# Patient Record
Sex: FEMALE | Race: WHITE
[De-identification: names, ages, dates, MRNs, and addresses within clinical notes are randomized per-mention and may not be internally consistent; named-entity substitution may affect disease eponyms.]

---

## 2019-09-03 ENCOUNTER — HOSPITAL ENCOUNTER (OUTPATIENT)
Dept: HOSPITAL 61 - RAD | Age: 59
Discharge: HOME | End: 2019-09-03
Attending: SURGERY
Payer: COMMERCIAL

## 2019-09-03 DIAGNOSIS — M79.89: ICD-10-CM

## 2019-09-03 DIAGNOSIS — M17.12: Primary | ICD-10-CM

## 2019-09-03 PROCEDURE — 73560 X-RAY EXAM OF KNEE 1 OR 2: CPT

## 2019-09-03 NOTE — RAD
3 view study of the left knee

 

Clinical indications: Left knee pain.

 

FINDINGS: No acute fracture or dislocation or lytic process is evident. No

significant left knee joint effusion is evident. There is mild 

degenerative spurring with mild joint space narrowing involving the medial

tibiofemoral joint compartment. Soft tissue swelling is seen medially. 

There is mild degenerative spurring of the patellofemoral joint 

compartment.

 

IMPRESSION: Mild primary degenerative osteoarthritis. Mild soft tissue 

swelling medially.

 

Electronically signed by: Alfonso Concepcion MD (9/3/2019 3:46 PM) [place] [id number]